# Patient Record
Sex: MALE | Race: WHITE | NOT HISPANIC OR LATINO | URBAN - METROPOLITAN AREA
[De-identification: names, ages, dates, MRNs, and addresses within clinical notes are randomized per-mention and may not be internally consistent; named-entity substitution may affect disease eponyms.]

---

## 2020-03-09 ENCOUNTER — OUTPATIENT (OUTPATIENT)
Dept: OUTPATIENT SERVICES | Facility: HOSPITAL | Age: 40
LOS: 1 days | End: 2020-03-09
Payer: COMMERCIAL

## 2020-03-09 VITALS
OXYGEN SATURATION: 100 % | HEIGHT: 70 IN | WEIGHT: 123.9 LBS | SYSTOLIC BLOOD PRESSURE: 119 MMHG | DIASTOLIC BLOOD PRESSURE: 81 MMHG | TEMPERATURE: 98 F | HEART RATE: 68 BPM | RESPIRATION RATE: 16 BRPM

## 2020-03-09 DIAGNOSIS — Z01.818 ENCOUNTER FOR OTHER PREPROCEDURAL EXAMINATION: ICD-10-CM

## 2020-03-09 DIAGNOSIS — N47.1 PHIMOSIS: ICD-10-CM

## 2020-03-09 DIAGNOSIS — D66 HEREDITARY FACTOR VIII DEFICIENCY: ICD-10-CM

## 2020-03-09 LAB
ANION GAP SERPL CALC-SCNC: 3 MMOL/L — LOW (ref 5–17)
APTT BLD: 22.4 SEC — LOW (ref 27.5–36.3)
BLD GP AB SCN SERPL QL: SIGNIFICANT CHANGE UP
BUN SERPL-MCNC: 10 MG/DL — SIGNIFICANT CHANGE UP (ref 7–18)
CALCIUM SERPL-MCNC: 9.1 MG/DL — SIGNIFICANT CHANGE UP (ref 8.4–10.5)
CHLORIDE SERPL-SCNC: 107 MMOL/L — SIGNIFICANT CHANGE UP (ref 96–108)
CO2 SERPL-SCNC: 31 MMOL/L — SIGNIFICANT CHANGE UP (ref 22–31)
CREAT SERPL-MCNC: 0.92 MG/DL — SIGNIFICANT CHANGE UP (ref 0.5–1.3)
GLUCOSE SERPL-MCNC: 91 MG/DL — SIGNIFICANT CHANGE UP (ref 70–99)
HCT VFR BLD CALC: 44.7 % — SIGNIFICANT CHANGE UP (ref 39–50)
HGB BLD-MCNC: 14.9 G/DL — SIGNIFICANT CHANGE UP (ref 13–17)
INR BLD: 1.04 RATIO — SIGNIFICANT CHANGE UP (ref 0.88–1.16)
MCHC RBC-ENTMCNC: 33.3 GM/DL — SIGNIFICANT CHANGE UP (ref 32–36)
MCHC RBC-ENTMCNC: 33.3 PG — SIGNIFICANT CHANGE UP (ref 27–34)
MCV RBC AUTO: 100 FL — SIGNIFICANT CHANGE UP (ref 80–100)
NRBC # BLD: 0 /100 WBCS — SIGNIFICANT CHANGE UP (ref 0–0)
PLATELET # BLD AUTO: 172 K/UL — SIGNIFICANT CHANGE UP (ref 150–400)
POTASSIUM SERPL-MCNC: 4 MMOL/L — SIGNIFICANT CHANGE UP (ref 3.5–5.3)
POTASSIUM SERPL-SCNC: 4 MMOL/L — SIGNIFICANT CHANGE UP (ref 3.5–5.3)
PROTHROM AB SERPL-ACNC: 11.6 SEC — SIGNIFICANT CHANGE UP (ref 10–12.9)
RBC # BLD: 4.47 M/UL — SIGNIFICANT CHANGE UP (ref 4.2–5.8)
RBC # FLD: 12.4 % — SIGNIFICANT CHANGE UP (ref 10.3–14.5)
SODIUM SERPL-SCNC: 141 MMOL/L — SIGNIFICANT CHANGE UP (ref 135–145)
WBC # BLD: 6.65 K/UL — SIGNIFICANT CHANGE UP (ref 3.8–10.5)
WBC # FLD AUTO: 6.65 K/UL — SIGNIFICANT CHANGE UP (ref 3.8–10.5)

## 2020-03-09 PROCEDURE — G0463: CPT

## 2020-03-09 RX ORDER — ANTIHEMOPHILIC FACTOR, HUM REC 250 (+/-)
3000 KIT INTRAVENOUS
Qty: 0 | Refills: 0 | DISCHARGE

## 2020-03-09 RX ORDER — EMICIZUMAB 300 MG/2ML
3 INJECTION, SOLUTION SUBCUTANEOUS
Qty: 0 | Refills: 0 | DISCHARGE

## 2020-03-09 NOTE — H&P PST ADULT - NEGATIVE OPHTHALMOLOGIC SYMPTOMS
no loss of vision L/no loss of vision R/no lacrimation L/no lacrimation R/no irritation R/no diplopia/no photophobia/no blurred vision L/no irritation L

## 2020-03-09 NOTE — H&P PST ADULT - NEGATIVE GENERAL GENITOURINARY SYMPTOMS
no hematuria/no renal colic/no nocturia/no flank pain L/no dysuria/no urinary hesitancy/normal urinary frequency/no flank pain R

## 2020-03-09 NOTE — H&P PST ADULT - NEGATIVE BREAST SYMPTOMS
no nipple discharge R/no breast tenderness L/no breast lump R/no breast lump L/no breast tenderness R/no nipple discharge L

## 2020-03-09 NOTE — H&P PST ADULT - NEGATIVE NEUROLOGICAL SYMPTOMS
no loss of sensation/no headache/no syncope/no loss of consciousness/no hemiparesis/no transient paralysis/no weakness/no paresthesias/no generalized seizures/no focal seizures/no tremors/no difficulty walking/no confusion/no vertigo

## 2020-03-09 NOTE — H&P PST ADULT - NSICDXPROBLEM_GEN_ALL_CORE_FT
PROBLEM DIAGNOSES  Problem: Hemophilia A  Assessment and Plan: under care of [facility]  [phone]. on meds injections every 2 weeks and prn IV push via butterfly in case of bleed, to have hematology clearance before surgery- rare spontaneus bleeding ,no bruises- befores surgery to self inject ( 2 hours before)  - to notify OR booking     Problem: Phimosis  Assessment and Plan: Patient  is scheduled for circumcision on 3/17/2020.

## 2020-03-09 NOTE — H&P PST ADULT - HISTORY OF PRESENT ILLNESS
39 years old male presented to [facility] for evaluation before surgery, pt was diagnosed with phimosis and is scheduled for circumcision on 3/17/2020.

## 2020-03-09 NOTE — H&P PST ADULT - NEGATIVE GASTROINTESTINAL SYMPTOMS
no change in bowel habits/no flatulence/no nausea/no vomiting/no constipation/no abdominal pain/no diarrhea

## 2023-04-06 NOTE — H&P PST ADULT - LIVES WITH, PROFILE
From: Chip Roberts  To: Hector Lawson  Sent: 4/6/2023 11:37 AM EDT  Subject: Prescription     A new order is also needed for  Methocarbamol. Thank you.
alone

## 2023-05-10 NOTE — H&P PST ADULT - NSANTHOSAYNRD_GEN_A_CORE
[Dtap/IPV] : Dtap/IPV [FreeTextEntry1] : Patient received  Dtap/Ipv vaccine on left deltoid IM. Patient tolerated vaccine well.  No. MARKO screening performed.  STOP BANG Legend: 0-2 = LOW Risk; 3-4 = INTERMEDIATE Risk; 5-8 = HIGH Risk

## 2024-01-30 NOTE — H&P PST ADULT - ABILITY TO HEAR (WITH HEARING AID OR HEARING APPLIANCE IF NORMALLY USED):
no abdominal pain, no bloating, no constipation, no diarrhea, no nausea and no vomiting. Adequate: hears normal conversation without difficulty